# Patient Record
Sex: FEMALE | Race: BLACK OR AFRICAN AMERICAN | Employment: FULL TIME
[De-identification: names, ages, dates, MRNs, and addresses within clinical notes are randomized per-mention and may not be internally consistent; named-entity substitution may affect disease eponyms.]

---

## 2017-08-21 PROBLEM — N93.9 ABNORMAL BLEEDING IN MENSTRUAL CYCLE: Status: ACTIVE | Noted: 2017-08-21

## 2017-08-21 PROBLEM — I10 ESSENTIAL HYPERTENSION: Status: ACTIVE | Noted: 2017-08-21

## 2017-11-02 PROBLEM — E07.9 THYROID DYSFUNCTION: Status: ACTIVE | Noted: 2017-11-02

## 2017-11-02 PROBLEM — Z88.9 MULTIPLE ALLERGIES: Status: ACTIVE | Noted: 2017-11-02

## 2018-09-13 PROBLEM — R73.03 PREDIABETES: Status: ACTIVE | Noted: 2018-09-13

## 2020-10-13 PROBLEM — D25.9 FIBROID UTERUS: Status: ACTIVE | Noted: 2020-10-13

## 2020-11-16 ENCOUNTER — NURSE TRIAGE (OUTPATIENT)
Dept: OTHER | Facility: CLINIC | Age: 50
End: 2020-11-16

## 2020-11-16 NOTE — TELEPHONE ENCOUNTER
Reason for Disposition   SEVERE pain (e.g., excruciating, unable to do any normal activities)    Answer Assessment - Initial Assessment Questions  1. LOCATION and RADIATION: \"Where is the pain located? \"       Right hip pain    2. QUALITY: \"What does the pain feel like? \"  (e.g., sharp, dull, aching, burning)  A lot of pressure    3. SEVERITY: \"How bad is the pain? \" \"What does it keep you from doing? \"   (Scale 1-10; or mild, moderate, severe)    -  MILD (1-3): doesn't interfere with normal activities     -  MODERATE (4-7): interferes with normal activities (e.g., work or school) or awakens from sleep, limping     -  SEVERE (8-10): excruciating pain, unable to do any normal activities, unable to walk  10/10, able to walk but is hard. Unable to lay or sit    4. ONSET: \"When did the pain start? \" \"Does it come and go, or is it there all the time? \"  Katelyn Rides last week, was seen in ED but still no better     5. WORK OR EXERCISE: \"Has there been any recent work or exercise that involved this part of the body?\"     6. CAUSE: \"What do you think is causing the hip pain? \"   Katelyn Rides last week    7. AGGRAVATING FACTORS: \"What makes the hip pain worse? \" (e.g., walking, climbing stairs, running)    8. OTHER SYMPTOMS: \"Do you have any other symptoms? \" (e.g., back pain, pain shooting down leg,  fever, rash)  Some leg pain and swollen foot. No numbness or tingling    Protocols used: HIP PAIN-ADULT-[place]    Pt calling with right hip pain. Katelyn Rides on 11/6, was seen in ED, no fracture. Still having right hip pain, today is 10/10, able to walk but hurts. Some pain in leg. Has been taking tylenol with no relief. Advised to call PCP and be seen today. Call back as needed.

## 2021-05-14 PROBLEM — G47.30 SLEEP APNEA: Status: ACTIVE | Noted: 2020-08-31

## 2021-05-14 PROBLEM — E78.5 HYPERLIPIDEMIA: Status: ACTIVE | Noted: 2020-08-31

## 2021-05-18 PROBLEM — M19.90 ARTHRITIS: Status: ACTIVE | Noted: 2021-05-18

## 2021-05-18 PROBLEM — R53.82 CHRONIC FATIGUE: Status: ACTIVE | Noted: 2021-05-18

## 2021-05-18 PROBLEM — E03.8 HYPOTHYROIDISM DUE TO HASHIMOTO'S THYROIDITIS: Status: ACTIVE | Noted: 2021-05-18

## 2021-05-18 PROBLEM — E06.3 HYPOTHYROIDISM DUE TO HASHIMOTO'S THYROIDITIS: Status: ACTIVE | Noted: 2021-05-18

## 2021-05-30 PROBLEM — E11.9 TYPE 2 DIABETES MELLITUS WITHOUT COMPLICATION, WITHOUT LONG-TERM CURRENT USE OF INSULIN (HCC): Status: ACTIVE | Noted: 2021-05-30

## 2021-05-30 PROBLEM — R76.8 POSITIVE ANA (ANTINUCLEAR ANTIBODY): Status: ACTIVE | Noted: 2021-05-30

## 2021-06-07 PROBLEM — H92.01 RIGHT EAR PAIN: Status: ACTIVE | Noted: 2021-06-07

## 2021-06-07 PROBLEM — M62.81 MUSCLE WEAKNESS: Status: ACTIVE | Noted: 2021-06-07

## 2021-12-09 PROBLEM — H52.13 MYOPIA OF BOTH EYES WITH ASTIGMATISM AND PRESBYOPIA: Chronic | Status: ACTIVE | Noted: 2021-12-09

## 2021-12-09 PROBLEM — H26.9 TRACE CATARACTS: Chronic | Status: ACTIVE | Noted: 2021-12-09

## 2021-12-09 PROBLEM — H52.203 MYOPIA OF BOTH EYES WITH ASTIGMATISM AND PRESBYOPIA: Chronic | Status: ACTIVE | Noted: 2021-12-09

## 2021-12-09 PROBLEM — H52.4 MYOPIA OF BOTH EYES WITH ASTIGMATISM AND PRESBYOPIA: Chronic | Status: ACTIVE | Noted: 2021-12-09

## 2022-08-11 PROBLEM — E03.8 HYPOTHYROIDISM DUE TO HASHIMOTO'S THYROIDITIS: Status: RESOLVED | Noted: 2021-05-18 | Resolved: 2022-08-11

## 2022-08-11 PROBLEM — G47.33 OSA (OBSTRUCTIVE SLEEP APNEA): Status: ACTIVE | Noted: 2020-08-31

## 2022-08-11 PROBLEM — E06.3 HYPOTHYROIDISM DUE TO HASHIMOTO'S THYROIDITIS: Status: RESOLVED | Noted: 2021-05-18 | Resolved: 2022-08-11

## 2022-08-11 PROBLEM — E66.01 CLASS 3 SEVERE OBESITY DUE TO EXCESS CALORIES WITHOUT SERIOUS COMORBIDITY WITH BODY MASS INDEX (BMI) OF 45.0 TO 49.9 IN ADULT (HCC): Status: ACTIVE | Noted: 2022-08-11

## 2022-08-11 PROBLEM — R60.0 LOWER EXTREMITY EDEMA: Status: ACTIVE | Noted: 2022-08-11
